# Patient Record
Sex: MALE | Race: WHITE | NOT HISPANIC OR LATINO
[De-identification: names, ages, dates, MRNs, and addresses within clinical notes are randomized per-mention and may not be internally consistent; named-entity substitution may affect disease eponyms.]

---

## 2020-12-21 ENCOUNTER — TRANSCRIPTION ENCOUNTER (OUTPATIENT)
Age: 60
End: 2020-12-21

## 2021-02-18 ENCOUNTER — TRANSCRIPTION ENCOUNTER (OUTPATIENT)
Age: 61
End: 2021-02-18

## 2023-03-01 ENCOUNTER — OFFICE (OUTPATIENT)
Dept: URBAN - METROPOLITAN AREA CLINIC 12 | Facility: CLINIC | Age: 63
Setting detail: OPHTHALMOLOGY
End: 2023-03-01
Payer: MEDICAID

## 2023-03-01 DIAGNOSIS — H40.013: ICD-10-CM

## 2023-03-01 DIAGNOSIS — H25.13: ICD-10-CM

## 2023-03-01 DIAGNOSIS — H52.4: ICD-10-CM

## 2023-03-01 DIAGNOSIS — H43.393: ICD-10-CM

## 2023-03-01 PROCEDURE — 92015 DETERMINE REFRACTIVE STATE: CPT | Performed by: OPTOMETRIST

## 2023-03-01 PROCEDURE — 92133 CPTRZD OPH DX IMG PST SGM ON: CPT | Performed by: OPTOMETRIST

## 2023-03-01 PROCEDURE — 92083 EXTENDED VISUAL FIELD XM: CPT | Performed by: OPTOMETRIST

## 2023-03-01 PROCEDURE — 92014 COMPRE OPH EXAM EST PT 1/>: CPT | Performed by: OPTOMETRIST

## 2023-03-01 ASSESSMENT — REFRACTION_MANIFEST
OS_VA1: 20/30-
OS_AXIS: 80
OS_SPHERE: -3.25
OD_AXIS: 105
OD_CYLINDER: -1.00
OS_SPHERE: -3.50
OD_CYLINDER: -1.00
OS_ADD: +2.50
OD_ADD: +2.50
OD_SPHERE: -1.75
OD_ADD: +2.50
OS_CYLINDER: -1.50
OD_VA1: 20/20-
OD_SPHERE: -1.25
OS_VA1: 20/25
OS_CYLINDER: -1.50
OD_VA1: 20/20
OS_AXIS: 080
OS_ADD: +2.50
OD_AXIS: 105

## 2023-03-01 ASSESSMENT — SPHEQUIV_DERIVED
OS_SPHEQUIV: -4.25
OD_SPHEQUIV: -1.75
OS_SPHEQUIV: -4
OD_SPHEQUIV: -2.25
OD_SPHEQUIV: -2.75
OS_SPHEQUIV: -5.125

## 2023-03-01 ASSESSMENT — KERATOMETRY
OS_K2POWER_DIOPTERS: 45.50
OS_AXISANGLE_DEGREES: 163
METHOD_AUTO_MANUAL: AUTO
OD_K1POWER_DIOPTERS: 44.50
OS_K1POWER_DIOPTERS: 44.50
OD_K2POWER_DIOPTERS: 45.00
OD_AXISANGLE_DEGREES: 10

## 2023-03-01 ASSESSMENT — REFRACTION_AUTOREFRACTION
OS_CYLINDER: -1.75
OD_AXIS: 107
OD_SPHERE: -2.25
OS_AXIS: 75
OD_CYLINDER: -1.00
OS_SPHERE: -4.25

## 2023-03-01 ASSESSMENT — REFRACTION_CURRENTRX
OD_ADD: +2.25
OS_OVR_VA: 20/
OD_SPHERE: -1.25
OD_OVR_VA: 20/
OD_AXIS: 091
OS_CYLINDER: -1.50
OD_VPRISM_DIRECTION: PROGS
OS_OVR_VA: 20/
OS_SPHERE: -2.75
OD_CYLINDER: -1.00
OD_OVR_VA: 20/
OS_VPRISM_DIRECTION: PROGS
OS_CYLINDER: -1.25
OD_SPHERE: -0.75
OS_ADD: +2.50
OD_ADD: +2.50
OD_AXIS: 105
OS_VPRISM_DIRECTION: PROGS
OS_AXIS: 070
OS_SPHERE: -3.50
OS_AXIS: 79
OD_CYLINDER: -1.00
OD_VPRISM_DIRECTION: PROGS
OS_ADD: +2.25

## 2023-03-01 ASSESSMENT — TONOMETRY
OS_IOP_MMHG: 17
OD_IOP_MMHG: 18

## 2023-03-01 ASSESSMENT — AXIALLENGTH_DERIVED
OS_AL: 24.751
OD_AL: 24.2205
OD_AL: 24.0169
OS_AL: 24.6443
OD_AL: 23.8167
OS_AL: 25.132

## 2023-03-01 ASSESSMENT — CONFRONTATIONAL VISUAL FIELD TEST (CVF)
OD_FINDINGS: FULL
OS_FINDINGS: FULL

## 2023-03-01 ASSESSMENT — VISUAL ACUITY
OS_BCVA: 20/30-1
OD_BCVA: 20/30-2

## 2023-03-01 ASSESSMENT — PACHYMETRY
OS_CT_UM: 536
OD_CT_UM: 526
OS_CT_CORRECTION: 1
OD_CT_CORRECTION: 1

## 2023-03-02 PROBLEM — H40.1111 POAG; RIGHT EYE MILD, LEFT EYE MILD: Status: ACTIVE | Noted: 2023-03-01

## 2023-03-02 PROBLEM — H40.1121 POAG; RIGHT EYE MILD, LEFT EYE MILD: Status: ACTIVE | Noted: 2023-03-01

## 2023-03-09 PROBLEM — Z00.00 ENCOUNTER FOR PREVENTIVE HEALTH EXAMINATION: Status: ACTIVE | Noted: 2023-03-09

## 2023-03-10 ENCOUNTER — APPOINTMENT (OUTPATIENT)
Dept: ORTHOPEDIC SURGERY | Facility: CLINIC | Age: 63
End: 2023-03-10
Payer: COMMERCIAL

## 2023-03-10 VITALS — BODY MASS INDEX: 28.44 KG/M2 | WEIGHT: 210 LBS | HEIGHT: 72 IN

## 2023-03-10 DIAGNOSIS — M19.90 UNSPECIFIED OSTEOARTHRITIS, UNSPECIFIED SITE: ICD-10-CM

## 2023-03-10 DIAGNOSIS — Z78.9 OTHER SPECIFIED HEALTH STATUS: ICD-10-CM

## 2023-03-10 DIAGNOSIS — M54.16 RADICULOPATHY, LUMBAR REGION: ICD-10-CM

## 2023-03-10 PROCEDURE — 99204 OFFICE O/P NEW MOD 45 MIN: CPT | Mod: 25

## 2023-03-10 PROCEDURE — 72110 X-RAY EXAM L-2 SPINE 4/>VWS: CPT

## 2023-03-10 RX ORDER — METHYLPREDNISOLONE 4 MG/1
4 TABLET ORAL
Qty: 1 | Refills: 0 | Status: ACTIVE | COMMUNITY
Start: 2023-03-10 | End: 1900-01-01

## 2023-03-10 NOTE — HISTORY OF PRESENT ILLNESS
[Lower back] : lower back [Tingling] : tingling [de-identified] : 03/10/2023: Patient is here for a new injury visit. States he started getting pain and numbness in his LT foot 1 month ago. No known injury/accident. No recent treatment.  New onset LLE weakness and numbness.  Has been   Recently drove to and from [place] with left leg pressed right up against door. \par  Hx of multiple back problems.  Evaluated at [facility] in [place] .  No MRI\par  Given MDP but took only days 1-3\par  [] : Post Surgical Visit: no [FreeTextEntry6] : numbness

## 2023-03-10 NOTE — IMAGING
[de-identified] : Spine:\par Inspection/Palpation:\par No tenderness to palpation throughout Cervical/thoracic/lumbar spine.\par No bony stepoffs, No lesions.\par \par Gait:\par Non-antalgic, able to perform bilateral toe and heel rise.  Able to perform tandem gait.  \par \par Range of Motion:\par Lumbar Spine: Flexion to 90 degrees, Extension to 30 degrees, rotation 30 degrees bilaterally, lateral flexion to 30 degrees bilaterally.\par \par Neurologic:\par \par Bilateral Lower Extremities 5/5 Iliopsoas/Quadriceps/Hamstrings/ Tibialis Anterior/ Gastrocnemius. Extensor Hallucis Longus/ Flexor Hallucis Longus except  L TA/EHL 4/5\par \par \par Sensation intact to light touch L2-S1\par \par Patellar/ Achilles reflex within normal limits.\par \par \par Negative straight leg raise\par \par No pain with IR/ER/Flexion of HIps bilaterally \par \par X-ray Ap/Lateral/Flexion/Extension of lumbar spine were viewed and interpreted.  loss of lordosis. Multilevel  DDD, and severe spondylosis

## 2023-03-10 NOTE — ASSESSMENT
[FreeTextEntry1] : 62 M with painless left foot drop and tingling\par MRI L spine stat rule out hnp\par MDP\par EMG rule out peroneal neuropathy given history\par FU after MRI and EMG

## 2023-03-13 ENCOUNTER — FORM ENCOUNTER (OUTPATIENT)
Age: 63
End: 2023-03-13

## 2023-03-14 ENCOUNTER — APPOINTMENT (OUTPATIENT)
Dept: MRI IMAGING | Facility: CLINIC | Age: 63
End: 2023-03-14
Payer: COMMERCIAL

## 2023-03-14 PROCEDURE — 72148 MRI LUMBAR SPINE W/O DYE: CPT

## 2023-03-16 ENCOUNTER — APPOINTMENT (OUTPATIENT)
Dept: NEUROLOGY | Facility: CLINIC | Age: 63
End: 2023-03-16

## 2023-03-24 ENCOUNTER — APPOINTMENT (OUTPATIENT)
Dept: ORTHOPEDIC SURGERY | Facility: CLINIC | Age: 63
End: 2023-03-24
Payer: COMMERCIAL

## 2023-03-24 VITALS — HEIGHT: 72 IN | WEIGHT: 210 LBS | BODY MASS INDEX: 28.44 KG/M2

## 2023-03-24 DIAGNOSIS — G57.32 LESION OF LATERAL POPLITEAL NERVE, LEFT LOWER LIMB: ICD-10-CM

## 2023-03-24 DIAGNOSIS — M21.372 FOOT DROP, LEFT FOOT: ICD-10-CM

## 2023-03-24 DIAGNOSIS — M54.50 LOW BACK PAIN, UNSPECIFIED: ICD-10-CM

## 2023-03-24 PROCEDURE — 99213 OFFICE O/P EST LOW 20 MIN: CPT

## 2023-03-24 NOTE — ASSESSMENT
[FreeTextEntry1] : 62 M with low back pain and stiffness.  And left peroneal neuropathy \par PT for foot and low back\par FU with neurology regarding peroneal neuropathy\par MRI L spine with multilevel degenerative changes.  \par FU 6 weeks if pain persists then consider Facet injections with pain management. \par

## 2023-03-24 NOTE — IMAGING
[de-identified] : Spine:\par Inspection/Palpation:\par No tenderness to palpation throughout Cervical/thoracic/lumbar spine.\par No bony stepoffs, No lesions.\par \par Gait:\par Non-antalgic, able to perform bilateral toe and heel rise.  Able to perform tandem gait.  \par \par Range of Motion:\par Lumbar Spine: Flexion to 90 degrees, Extension to 30 degrees, rotation 30 degrees bilaterally, lateral flexion to 30 degrees bilaterally.\par \par Neurologic:\par \par Bilateral Lower Extremities 5/5 Iliopsoas/Quadriceps/Hamstrings/ Tibialis Anterior/ Gastrocnemius. Extensor Hallucis Longus/ Flexor Hallucis Longus except  L TA/EHL 4/5\par \par \par Sensation intact to light touch L2-S1\par \par Patellar/ Achilles reflex within normal limits.\par \par \par Negative straight leg raise\par \par No pain with IR/ER/Flexion of HIps bilaterally \par \par X-ray Ap/Lateral/Flexion/Extension of lumbar spine were viewed and interpreted.  loss of lordosis. Multilevel  DDD, and severe spondylosis\par \par MRI Lumbar spine reviewed and interpreted independently.  Agree with report as follows. \par 1. Convexity of the upper-to-mid lumbar spine towards the right, multilevel degenerative disc disease, and multiple disc \par herniations.\par 2. Moderate central stenosis with impingement upon left greater than right exiting L3 nerve roots at L3-L4.\par 3. Moderate central stenosis with impingement upon the right L5 nerve root and right exiting L4 nerve root and left \par exiting L4 nerve root encroachment at L4-L5.\par 4. Mild central stenosis with impingement upon left greater than right exiting L2 nerve roots at L2-L3.\par 5. Right foraminal herniation impinging the right exiting L1 nerve root at L1-L2.\par 6. Findings suggesting multiple cysts in the visualized left kidney incompletely evaluated on the current exam.\par Recommend ultrasound of the kidneys to further evaluate.\par 7. Scattered degenerative endplate changes most severe on the right at L3-L4 and on the left at L4-L5 without acute \par vertebral body fracture.\par \par EMG report in chart.  Peroneal neuropathy

## 2023-03-24 NOTE — HISTORY OF PRESENT ILLNESS
[Lower back] : lower back [Tingling] : tingling [de-identified] : 03/10/2023: Patient is here for a new injury visit. States he started getting pain and numbness in his LT foot 1 month ago. No known injury/accident. No recent treatment.  New onset LLE weakness and numbness.  Has been   Recently drove to and from [place] with left leg pressed right up against door. \par  Hx of multiple back problems.  Evaluated at [facility] in [place] .  No MRI\par  Given MDP but took only days 1-3\par \par 03/24/2023: Follow up for MRI results. [] : Post Surgical Visit: no [FreeTextEntry6] : numbness

## 2024-01-04 PROBLEM — H11.153 PINGUECULA ; BOTH EYES: Status: ACTIVE | Noted: 2024-01-04

## 2024-09-16 ENCOUNTER — OFFICE (OUTPATIENT)
Dept: URBAN - METROPOLITAN AREA CLINIC 12 | Facility: CLINIC | Age: 64
Setting detail: OPHTHALMOLOGY
End: 2024-09-16
Payer: MEDICAID

## 2024-09-16 ENCOUNTER — RX ONLY (RX ONLY)
Age: 64
End: 2024-09-16

## 2024-09-16 DIAGNOSIS — H25.13: ICD-10-CM

## 2024-09-16 DIAGNOSIS — H40.1111: ICD-10-CM

## 2024-09-16 DIAGNOSIS — H43.393: ICD-10-CM

## 2024-09-16 DIAGNOSIS — H40.1121: ICD-10-CM

## 2024-09-16 PROCEDURE — 92083 EXTENDED VISUAL FIELD XM: CPT | Performed by: OPTOMETRIST

## 2024-09-16 PROCEDURE — 92014 COMPRE OPH EXAM EST PT 1/>: CPT | Performed by: OPTOMETRIST

## 2024-09-16 PROCEDURE — 92133 CPTRZD OPH DX IMG PST SGM ON: CPT | Performed by: OPTOMETRIST

## 2024-09-16 ASSESSMENT — CONFRONTATIONAL VISUAL FIELD TEST (CVF)
OS_FINDINGS: FULL
OD_FINDINGS: FULL

## 2024-09-18 ENCOUNTER — OFFICE (OUTPATIENT)
Dept: URBAN - METROPOLITAN AREA CLINIC 12 | Facility: CLINIC | Age: 64
Setting detail: OPHTHALMOLOGY
End: 2024-09-18
Payer: MEDICAID

## 2024-09-18 DIAGNOSIS — H43.393: ICD-10-CM

## 2024-09-18 DIAGNOSIS — H25.12: ICD-10-CM

## 2024-09-18 DIAGNOSIS — H40.013: ICD-10-CM

## 2024-09-18 DIAGNOSIS — H25.11: ICD-10-CM

## 2024-09-18 DIAGNOSIS — H25.13: ICD-10-CM

## 2024-09-18 PROCEDURE — 99214 OFFICE O/P EST MOD 30 MIN: CPT | Performed by: OPHTHALMOLOGY

## 2024-09-18 PROCEDURE — 92250 FUNDUS PHOTOGRAPHY W/I&R: CPT | Performed by: OPHTHALMOLOGY

## 2024-09-18 ASSESSMENT — CONFRONTATIONAL VISUAL FIELD TEST (CVF)
OS_FINDINGS: FULL
OD_FINDINGS: FULL

## 2024-09-25 ENCOUNTER — OFFICE (OUTPATIENT)
Dept: URBAN - METROPOLITAN AREA CLINIC 12 | Facility: CLINIC | Age: 64
Setting detail: OPHTHALMOLOGY
End: 2024-09-25
Payer: MEDICAID

## 2024-09-25 DIAGNOSIS — H25.13: ICD-10-CM

## 2024-09-25 DIAGNOSIS — H25.12: ICD-10-CM

## 2024-09-25 PROBLEM — H40.013 GLAUCOMA SUSPECT, LOW RISK 1-2 FACTORS; BOTH EYES: Status: ACTIVE | Noted: 2024-09-18

## 2024-09-25 PROBLEM — H25.11 CATARACT SENILE NUCLEAR SCLEROSIS; RIGHT EYE, LEFT EYE, BOTH EYES: Status: ACTIVE | Noted: 2024-09-18

## 2024-09-25 PROCEDURE — 92136 OPHTHALMIC BIOMETRY: CPT | Mod: TC | Performed by: OPHTHALMOLOGY

## 2024-09-25 PROCEDURE — 92136 OPHTHALMIC BIOMETRY: CPT | Mod: LT | Performed by: OPHTHALMOLOGY

## 2024-09-25 PROCEDURE — 99213 OFFICE O/P EST LOW 20 MIN: CPT | Performed by: OPHTHALMOLOGY

## 2024-09-25 ASSESSMENT — CONFRONTATIONAL VISUAL FIELD TEST (CVF)
OS_FINDINGS: FULL
OD_FINDINGS: FULL

## 2024-10-08 ENCOUNTER — ASC (OUTPATIENT)
Dept: URBAN - METROPOLITAN AREA SURGERY 8 | Facility: SURGERY | Age: 64
Setting detail: OPHTHALMOLOGY
End: 2024-10-08
Payer: COMMERCIAL

## 2024-10-08 DIAGNOSIS — H52.212: ICD-10-CM

## 2024-10-08 DIAGNOSIS — H25.12: ICD-10-CM

## 2024-10-08 PROCEDURE — FEMTO FEMTOSECOND LASER: Mod: GY | Performed by: OPHTHALMOLOGY

## 2024-10-08 PROCEDURE — 66984 XCAPSL CTRC RMVL W/O ECP: CPT | Mod: LT | Performed by: OPHTHALMOLOGY

## 2024-10-09 ENCOUNTER — RX ONLY (RX ONLY)
Age: 64
End: 2024-10-09

## 2024-10-09 ENCOUNTER — OFFICE (OUTPATIENT)
Dept: URBAN - METROPOLITAN AREA CLINIC 12 | Facility: CLINIC | Age: 64
Setting detail: OPHTHALMOLOGY
End: 2024-10-09
Payer: COMMERCIAL

## 2024-10-09 DIAGNOSIS — Z96.1: ICD-10-CM

## 2024-10-09 PROCEDURE — 99024 POSTOP FOLLOW-UP VISIT: CPT | Performed by: OPTOMETRIST

## 2024-10-09 ASSESSMENT — REFRACTION_CURRENTRX
OD_SPHERE: -1.25
OS_VPRISM_DIRECTION: PROGS
OD_VPRISM_DIRECTION: PROGS
OD_CYLINDER: -1.00
OD_SPHERE: -1.75
OS_AXIS: 79
OD_OVR_VA: 20/
OS_SPHERE: -3.50
OD_VPRISM_DIRECTION: PROGS
OD_AXIS: 105
OS_CYLINDER: -1.50
OS_ADD: +2.50
OD_OVR_VA: 20/
OS_SPHERE: -3.50
OS_AXIS: 077
OD_ADD: +2.25
OD_CYLINDER: -1.00
OS_OVR_VA: 20/
OS_ADD: +2.25
OS_OVR_VA: 20/
OD_AXIS: 105
OD_ADD: +2.50
OS_CYLINDER: -1.25
OS_VPRISM_DIRECTION: PROGS

## 2024-10-09 ASSESSMENT — REFRACTION_MANIFEST
OD_VA1: 20/30-
OS_AXIS: 80
OD_AXIS: 109
OD_SPHERE: -3.25
OD_CYLINDER: -1.00
OD_ADD: +2.50
OD_ADD: +2.50
OS_SPHERE: -6.00
OD_CYLINDER: -1.00
OS_CYLINDER: -1.50
OS_AXIS: 078
OS_VA1: 20/60-2
OD_SPHERE: -1.75
OD_SPHERE: -3.00
OS_SPHERE: -3.50
OS_ADD: +2.50
OS_ADD: +2.50
OS_CYLINDER: -1.50
OD_AXIS: 105
OD_VA1: 20/30-1
OS_AXIS: 082
OS_SPHERE: -6.25
OD_AXIS: 119
OS_CYLINDER: -1.50
OD_VA1: 20/20-
OS_VA1: 20/30-
OD_CYLINDER: -1.00
OS_VA1: 20/50-

## 2024-10-09 ASSESSMENT — VISUAL ACUITY
OD_BCVA: 20/25-2
OS_BCVA: 20/400

## 2024-10-09 ASSESSMENT — PACHYMETRY
OS_CT_UM: 536
OS_CT_CORRECTION: 1
OD_CT_CORRECTION: 1
OD_CT_UM: 526

## 2024-10-09 ASSESSMENT — KERATOMETRY
OS_K2POWER_DIOPTERS: 45.50
METHOD_AUTO_MANUAL: AUTO
OD_K2POWER_DIOPTERS: 44.75
OD_AXISANGLE_DEGREES: 009
OS_K1POWER_DIOPTERS: 44.75
OS_AXISANGLE_DEGREES: 171
OD_K1POWER_DIOPTERS: 44.00

## 2024-10-09 ASSESSMENT — CONFRONTATIONAL VISUAL FIELD TEST (CVF)
OS_FINDINGS: FULL
OD_FINDINGS: FULL

## 2024-10-09 ASSESSMENT — REFRACTION_AUTOREFRACTION
OS_CYLINDER: -1.00
OS_SPHERE: +0.50
OD_SPHERE: -2.75
OS_AXIS: 083
OD_AXIS: 112
OD_CYLINDER: -1.25

## 2024-10-09 ASSESSMENT — TONOMETRY: OD_IOP_MMHG: 16

## 2024-10-14 ENCOUNTER — OFFICE (OUTPATIENT)
Dept: URBAN - METROPOLITAN AREA CLINIC 12 | Facility: CLINIC | Age: 64
Setting detail: OPHTHALMOLOGY
End: 2024-10-14
Payer: COMMERCIAL

## 2024-10-14 DIAGNOSIS — H25.11: ICD-10-CM

## 2024-10-14 PROCEDURE — 92136 OPHTHALMIC BIOMETRY: CPT | Mod: RT | Performed by: OPHTHALMOLOGY

## 2024-10-14 ASSESSMENT — REFRACTION_CURRENTRX
OD_SPHERE: -1.25
OD_AXIS: 105
OS_OVR_VA: 20/
OS_OVR_VA: 20/
OS_AXIS: 79
OS_CYLINDER: -1.50
OD_VPRISM_DIRECTION: PROGS
OD_SPHERE: -1.75
OD_ADD: +2.25
OS_CYLINDER: -1.25
OS_AXIS: 077
OD_ADD: +2.50
OD_OVR_VA: 20/
OS_VPRISM_DIRECTION: PROGS
OD_OVR_VA: 20/
OS_ADD: +2.25
OS_VPRISM_DIRECTION: PROGS
OD_CYLINDER: -1.00
OD_VPRISM_DIRECTION: PROGS
OD_CYLINDER: -1.00
OS_SPHERE: -3.50
OS_SPHERE: -3.50
OS_ADD: +2.50
OD_AXIS: 105

## 2024-10-14 ASSESSMENT — REFRACTION_MANIFEST
OS_SPHERE: -6.25
OS_SPHERE: -3.50
OD_AXIS: 105
OD_AXIS: 119
OD_CYLINDER: -1.00
OS_ADD: +2.50
OS_VA1: 20/50-
OS_CYLINDER: -1.50
OS_AXIS: 80
OS_ADD: +2.50
OD_SPHERE: -3.00
OD_SPHERE: -1.75
OS_SPHERE: -6.00
OD_SPHERE: -3.25
OD_AXIS: 109
OD_ADD: +2.50
OS_VA1: 20/60-2
OD_VA1: 20/30-1
OD_CYLINDER: -1.00
OD_CYLINDER: -1.00
OD_ADD: +2.50
OD_VA1: 20/20-
OD_VA1: 20/30-
OS_CYLINDER: -1.50
OS_VA1: 20/30-
OS_AXIS: 078
OS_AXIS: 082
OS_CYLINDER: -1.50

## 2024-10-14 ASSESSMENT — KERATOMETRY
OS_AXISANGLE_DEGREES: 161
OD_AXISANGLE_DEGREES: 016
OS_K1POWER_DIOPTERS: 44.25
OD_K2POWER_DIOPTERS: 44.75
OS_K2POWER_DIOPTERS: 45.25
OD_K1POWER_DIOPTERS: 44.25
METHOD_AUTO_MANUAL: AUTO

## 2024-10-14 ASSESSMENT — REFRACTION_AUTOREFRACTION
OS_SPHERE: +1.00
OS_CYLINDER: -1.50
OD_SPHERE: -3.00
OS_AXIS: 074
OD_CYLINDER: -1.25
OD_AXIS: 110

## 2024-10-14 ASSESSMENT — PACHYMETRY
OD_CT_CORRECTION: 1
OS_CT_UM: 536
OD_CT_UM: 526
OS_CT_CORRECTION: 1

## 2024-10-14 ASSESSMENT — TONOMETRY
OS_IOP_MMHG: 15
OD_IOP_MMHG: 13

## 2024-10-14 ASSESSMENT — VISUAL ACUITY
OS_BCVA: 20/300
OD_BCVA: 20/30-2

## 2024-10-14 ASSESSMENT — CONFRONTATIONAL VISUAL FIELD TEST (CVF)
OD_FINDINGS: FULL
OS_FINDINGS: FULL

## 2024-10-22 ENCOUNTER — ASC (OUTPATIENT)
Dept: URBAN - METROPOLITAN AREA SURGERY 8 | Facility: SURGERY | Age: 64
Setting detail: OPHTHALMOLOGY
End: 2024-10-22
Payer: COMMERCIAL

## 2024-10-22 DIAGNOSIS — H25.11: ICD-10-CM

## 2024-10-22 DIAGNOSIS — H52.211: ICD-10-CM

## 2024-10-22 PROCEDURE — FEMTO PRECISION LASER CATARACT SURGERY: Mod: GY | Performed by: OPHTHALMOLOGY

## 2024-10-22 PROCEDURE — 66984 XCAPSL CTRC RMVL W/O ECP: CPT | Mod: 79,RT | Performed by: OPHTHALMOLOGY

## 2024-10-23 ENCOUNTER — RX ONLY (RX ONLY)
Age: 64
End: 2024-10-23

## 2024-10-23 ENCOUNTER — OFFICE (OUTPATIENT)
Dept: URBAN - METROPOLITAN AREA CLINIC 12 | Facility: CLINIC | Age: 64
Setting detail: OPHTHALMOLOGY
End: 2024-10-23
Payer: COMMERCIAL

## 2024-10-23 DIAGNOSIS — Z96.1: ICD-10-CM

## 2024-10-23 PROCEDURE — 99024 POSTOP FOLLOW-UP VISIT: CPT | Performed by: OPTOMETRIST

## 2024-10-23 ASSESSMENT — VISUAL ACUITY
OS_BCVA: 20/30
OD_BCVA: 20/30-2

## 2024-10-23 ASSESSMENT — REFRACTION_CURRENTRX
OD_VPRISM_DIRECTION: PROGS
OD_OVR_VA: 20/
OS_CYLINDER: -1.50
OS_VPRISM_DIRECTION: PROGS
OD_ADD: +2.25
OD_CYLINDER: -1.00
OS_VPRISM_DIRECTION: PROGS
OD_SPHERE: -1.25
OS_SPHERE: -3.50
OS_ADD: +2.25
OS_OVR_VA: 20/
OD_ADD: +2.50
OD_SPHERE: -1.75
OS_CYLINDER: -1.25
OS_AXIS: 79
OS_OVR_VA: 20/
OD_CYLINDER: -1.00
OD_OVR_VA: 20/
OD_AXIS: 105
OS_ADD: +2.50
OD_VPRISM_DIRECTION: PROGS
OS_AXIS: 077
OD_AXIS: 105
OS_SPHERE: -3.50

## 2024-10-23 ASSESSMENT — REFRACTION_MANIFEST
OD_ADD: +2.50
OD_SPHERE: -1.75
OS_ADD: +2.50
OS_ADD: +2.50
OD_VA1: 20/30-
OD_AXIS: 105
OS_VA1: 20/50-
OD_CYLINDER: -1.00
OS_SPHERE: -6.25
OD_SPHERE: -3.25
OS_CYLINDER: -1.50
OD_ADD: +2.50
OS_SPHERE: -6.00
OD_CYLINDER: -1.00
OS_CYLINDER: -1.50
OS_AXIS: 078
OD_CYLINDER: -1.00
OS_CYLINDER: -1.50
OS_AXIS: 80
OD_VA1: 20/20-
OD_VA1: 20/30-1
OS_SPHERE: -3.50
OD_AXIS: 109
OS_VA1: 20/30-
OS_AXIS: 082
OD_SPHERE: -3.00
OD_AXIS: 119
OS_VA1: 20/60-2

## 2024-10-23 ASSESSMENT — KERATOMETRY
OD_AXISANGLE_DEGREES: 025
OS_K2POWER_DIOPTERS: 45.50
OD_K2POWER_DIOPTERS: 45.50
OS_AXISANGLE_DEGREES: 172
OS_K1POWER_DIOPTERS: 44.50
OD_K1POWER_DIOPTERS: 45.00
METHOD_AUTO_MANUAL: AUTO

## 2024-10-23 ASSESSMENT — PACHYMETRY
OD_CT_CORRECTION: 1
OS_CT_CORRECTION: 1
OD_CT_UM: 526
OS_CT_UM: 536

## 2024-10-23 ASSESSMENT — REFRACTION_AUTOREFRACTION
OD_SPHERE: -0.25
OS_AXIS: 083
OS_CYLINDER: -1.25
OD_AXIS: 109
OD_CYLINDER: -1.25
OS_SPHERE: +0.75

## 2024-10-23 ASSESSMENT — TONOMETRY
OD_IOP_MMHG: 18
OS_IOP_MMHG: 11

## 2024-10-23 ASSESSMENT — CONFRONTATIONAL VISUAL FIELD TEST (CVF)
OD_FINDINGS: FULL
OS_FINDINGS: FULL

## 2024-10-28 ENCOUNTER — RX ONLY (RX ONLY)
Age: 64
End: 2024-10-28

## 2024-10-28 ENCOUNTER — OFFICE (OUTPATIENT)
Dept: URBAN - METROPOLITAN AREA CLINIC 12 | Facility: CLINIC | Age: 64
Setting detail: OPHTHALMOLOGY
End: 2024-10-28
Payer: COMMERCIAL

## 2024-10-28 DIAGNOSIS — Z96.1: ICD-10-CM

## 2024-10-28 PROCEDURE — 99024 POSTOP FOLLOW-UP VISIT: CPT | Performed by: OPTOMETRIST

## 2024-10-28 ASSESSMENT — KERATOMETRY
OD_K1POWER_DIOPTERS: 44.50
OD_K2POWER_DIOPTERS: 44.50
OS_K2POWER_DIOPTERS: 45.25
OS_AXISANGLE_DEGREES: 164
METHOD_AUTO_MANUAL: AUTO
OD_AXISANGLE_DEGREES: 090
OS_K1POWER_DIOPTERS: 44.50

## 2024-10-28 ASSESSMENT — REFRACTION_MANIFEST
OS_SPHERE: -6.25
OS_CYLINDER: -1.50
OS_ADD: +2.50
OD_VA1: 20/30-
OS_VA1: 20/60-2
OD_VA1: 20/30-1
OD_AXIS: 119
OD_SPHERE: -3.00
OD_AXIS: 109
OD_CYLINDER: -1.00
OS_SPHERE: -3.50
OS_VA1: 20/30-
OS_VA1: 20/50-
OS_AXIS: 078
OD_CYLINDER: -1.00
OS_AXIS: 082
OD_ADD: +2.50
OS_CYLINDER: -1.50
OS_SPHERE: -6.00
OS_CYLINDER: -1.50
OD_CYLINDER: -1.00
OS_AXIS: 80
OD_SPHERE: -3.25
OD_AXIS: 105
OS_ADD: +2.50
OD_ADD: +2.50
OD_SPHERE: -1.75
OD_VA1: 20/20-

## 2024-10-28 ASSESSMENT — PACHYMETRY
OS_CT_UM: 536
OD_CT_UM: 526
OD_CT_CORRECTION: 1
OS_CT_CORRECTION: 1

## 2024-10-28 ASSESSMENT — REFRACTION_CURRENTRX
OD_CYLINDER: -1.00
OD_VPRISM_DIRECTION: PROGS
OS_SPHERE: -3.50
OD_SPHERE: -1.25
OD_CYLINDER: -1.00
OS_SPHERE: -3.50
OS_VPRISM_DIRECTION: PROGS
OD_AXIS: 105
OD_VPRISM_DIRECTION: PROGS
OD_ADD: +2.25
OS_CYLINDER: -1.25
OD_OVR_VA: 20/
OS_ADD: +2.25
OD_SPHERE: -1.75
OS_OVR_VA: 20/
OD_AXIS: 105
OS_VPRISM_DIRECTION: PROGS
OD_OVR_VA: 20/
OS_OVR_VA: 20/
OS_AXIS: 077
OS_AXIS: 79
OD_ADD: +2.50
OS_CYLINDER: -1.50
OS_ADD: +2.50

## 2024-10-28 ASSESSMENT — REFRACTION_AUTOREFRACTION
OD_CYLINDER: -1.00
OS_AXIS: 076
OD_SPHERE: -0.50
OS_SPHERE: +0.75
OS_CYLINDER: -1.25
OD_AXIS: 102

## 2024-10-28 ASSESSMENT — CONFRONTATIONAL VISUAL FIELD TEST (CVF)
OD_FINDINGS: FULL
OS_FINDINGS: FULL

## 2024-10-28 ASSESSMENT — TONOMETRY
OD_IOP_MMHG: 15
OS_IOP_MMHG: 14

## 2024-10-28 ASSESSMENT — VISUAL ACUITY
OS_BCVA: 20/25-1
OD_BCVA: 20/25

## 2024-11-21 ENCOUNTER — OFFICE (OUTPATIENT)
Dept: URBAN - METROPOLITAN AREA CLINIC 12 | Facility: CLINIC | Age: 64
Setting detail: OPHTHALMOLOGY
End: 2024-11-21
Payer: COMMERCIAL

## 2024-11-21 DIAGNOSIS — H40.1121: ICD-10-CM

## 2024-11-21 DIAGNOSIS — H16.223: ICD-10-CM

## 2024-11-21 DIAGNOSIS — H40.001: ICD-10-CM

## 2024-11-21 PROCEDURE — 99214 OFFICE O/P EST MOD 30 MIN: CPT | Performed by: STUDENT IN AN ORGANIZED HEALTH CARE EDUCATION/TRAINING PROGRAM

## 2024-11-21 PROCEDURE — 92133 CPTRZD OPH DX IMG PST SGM ON: CPT | Performed by: STUDENT IN AN ORGANIZED HEALTH CARE EDUCATION/TRAINING PROGRAM

## 2024-11-21 PROCEDURE — 92020 GONIOSCOPY: CPT | Performed by: STUDENT IN AN ORGANIZED HEALTH CARE EDUCATION/TRAINING PROGRAM

## 2024-11-21 PROCEDURE — 92083 EXTENDED VISUAL FIELD XM: CPT | Performed by: STUDENT IN AN ORGANIZED HEALTH CARE EDUCATION/TRAINING PROGRAM

## 2024-11-21 ASSESSMENT — REFRACTION_AUTOREFRACTION
OS_CYLINDER: -1.50
OS_AXIS: 080
OD_AXIS: 101
OD_SPHERE: +0.75
OD_CYLINDER: -1.00
OS_SPHERE: +1.25

## 2024-11-21 ASSESSMENT — REFRACTION_CURRENTRX
OD_CYLINDER: -1.00
OS_VPRISM_DIRECTION: PROGS
OS_SPHERE: -3.50
OD_SPHERE: -1.25
OS_CYLINDER: -1.50
OS_AXIS: 077
OD_VPRISM_DIRECTION: PROGS
OD_SPHERE: -1.75
OD_AXIS: 105
OD_ADD: +2.50
OS_AXIS: 79
OD_OVR_VA: 20/
OS_CYLINDER: -1.25
OS_ADD: +2.25
OS_OVR_VA: 20/
OD_VPRISM_DIRECTION: PROGS
OD_CYLINDER: -1.00
OD_AXIS: 105
OD_OVR_VA: 20/
OD_ADD: +2.25
OS_OVR_VA: 20/
OS_SPHERE: -3.50
OS_ADD: +2.50
OS_VPRISM_DIRECTION: PROGS

## 2024-11-21 ASSESSMENT — REFRACTION_MANIFEST
OS_VA1: 20/50-
OS_SPHERE: -3.50
OS_SPHERE: -6.00
OS_CYLINDER: -1.50
OD_SPHERE: -3.25
OD_VA1: 20/30-
OD_SPHERE: -3.00
OS_AXIS: 082
OS_ADD: +2.50
OD_ADD: +2.50
OS_SPHERE: -6.25
OD_CYLINDER: -1.00
OD_CYLINDER: -1.00
OS_AXIS: 078
OD_AXIS: 119
OS_AXIS: 80
OD_VA1: 20/30-1
OD_SPHERE: -1.75
OS_CYLINDER: -1.50
OD_AXIS: 105
OS_VA1: 20/30-
OS_CYLINDER: -1.50
OD_VA1: 20/20-
OS_VA1: 20/60-2
OD_CYLINDER: -1.00
OD_AXIS: 109
OS_ADD: +2.50
OD_ADD: +2.50

## 2024-11-21 ASSESSMENT — KERATOMETRY
OS_K2POWER_DIOPTERS: 45.00
OS_K1POWER_DIOPTERS: 44.25
OD_K2POWER_DIOPTERS: 44.75
OD_AXISANGLE_DEGREES: 022
OS_AXISANGLE_DEGREES: 165
METHOD_AUTO_MANUAL: AUTO
OD_K1POWER_DIOPTERS: 44.25

## 2024-11-21 ASSESSMENT — VISUAL ACUITY
OD_BCVA: 20/40+3
OS_BCVA: 20/30+

## 2024-11-21 ASSESSMENT — CONFRONTATIONAL VISUAL FIELD TEST (CVF)
OD_FINDINGS: FULL
OS_FINDINGS: FULL

## 2024-12-02 ENCOUNTER — OFFICE (OUTPATIENT)
Dept: URBAN - METROPOLITAN AREA CLINIC 12 | Facility: CLINIC | Age: 64
Setting detail: OPHTHALMOLOGY
End: 2024-12-02
Payer: COMMERCIAL

## 2024-12-02 DIAGNOSIS — Z96.1: ICD-10-CM

## 2024-12-02 DIAGNOSIS — H16.223: ICD-10-CM

## 2024-12-02 PROBLEM — H40.001 GLAUCOMA SUSPECT; RIGHT EYE: Status: ACTIVE | Noted: 2024-11-21

## 2024-12-02 PROBLEM — H40.1121 POAG; LEFT EYE MILD: Status: ACTIVE | Noted: 2024-11-21

## 2024-12-02 PROCEDURE — 99024 POSTOP FOLLOW-UP VISIT: CPT | Performed by: OPTOMETRIST

## 2024-12-02 ASSESSMENT — REFRACTION_MANIFEST
OS_ADD: +2.50
OD_CYLINDER: -1.00
OD_ADD: +2.50
OD_ADD: +2.50
OS_AXIS: 078
OD_SPHERE: -3.00
OS_SPHERE: -3.50
OS_VA1: 20/50-
OD_CYLINDER: -1.00
OD_AXIS: 119
OS_SPHERE: -6.00
OD_AXIS: 109
OS_SPHERE: -6.25
OS_AXIS: 082
OS_VA1: 20/30-
OD_VA1: 20/30-
OD_VA1: 20/20-
OD_SPHERE: -1.75
OD_AXIS: 105
OS_CYLINDER: -1.50
OS_CYLINDER: -1.50
OS_VA1: 20/60-2
OS_ADD: +2.50
OD_CYLINDER: -1.00
OS_CYLINDER: -1.50
OS_AXIS: 80
OD_VA1: 20/30-1
OD_SPHERE: -3.25

## 2024-12-02 ASSESSMENT — REFRACTION_CURRENTRX
OS_ADD: +2.50
OS_OVR_VA: 20/
OD_AXIS: 105
OD_OVR_VA: 20/
OD_VPRISM_DIRECTION: PROGS
OD_CYLINDER: -1.00
OS_VPRISM_DIRECTION: PROGS
OS_ADD: +2.25
OD_SPHERE: -1.25
OD_AXIS: 105
OD_ADD: +2.50
OD_SPHERE: -1.75
OD_ADD: +2.25
OS_CYLINDER: -1.25
OD_VPRISM_DIRECTION: PROGS
OD_OVR_VA: 20/
OS_VPRISM_DIRECTION: PROGS
OS_SPHERE: -3.50
OS_AXIS: 79
OS_CYLINDER: -1.50
OD_CYLINDER: -1.00
OS_SPHERE: -3.50
OS_AXIS: 077
OS_OVR_VA: 20/

## 2024-12-02 ASSESSMENT — PACHYMETRY
OD_CT_CORRECTION: 1
OD_CT_UM: 526
OS_CT_CORRECTION: 1
OS_CT_UM: 536

## 2024-12-02 ASSESSMENT — KERATOMETRY
OD_AXISANGLE_DEGREES: 021
METHOD_AUTO_MANUAL: AUTO
OS_K1POWER_DIOPTERS: 44.00
OS_K2POWER_DIOPTERS: 45.25
OS_AXISANGLE_DEGREES: 167
OD_K1POWER_DIOPTERS: 44.25
OD_K2POWER_DIOPTERS: 44.50

## 2024-12-02 ASSESSMENT — CONFRONTATIONAL VISUAL FIELD TEST (CVF)
OS_FINDINGS: FULL
OD_FINDINGS: FULL

## 2024-12-02 ASSESSMENT — VISUAL ACUITY
OD_BCVA: 20/30-2
OS_BCVA: 20/30+

## 2024-12-02 ASSESSMENT — TONOMETRY
OD_IOP_MMHG: 10
OS_IOP_MMHG: 15

## 2024-12-02 ASSESSMENT — REFRACTION_AUTOREFRACTION
OD_AXIS: 099
OS_CYLINDER: -2.00
OD_CYLINDER: -1.00
OS_SPHERE: +1.75
OS_AXIS: 077
OD_SPHERE: +0.75

## 2025-01-02 ENCOUNTER — OFFICE (OUTPATIENT)
Dept: URBAN - METROPOLITAN AREA CLINIC 12 | Facility: CLINIC | Age: 65
Setting detail: OPHTHALMOLOGY
End: 2025-01-02
Payer: COMMERCIAL

## 2025-01-02 ENCOUNTER — RX ONLY (RX ONLY)
Age: 65
End: 2025-01-02

## 2025-01-02 DIAGNOSIS — Z96.1: ICD-10-CM

## 2025-01-02 DIAGNOSIS — H40.001: ICD-10-CM

## 2025-01-02 DIAGNOSIS — H40.1121: ICD-10-CM

## 2025-01-02 DIAGNOSIS — H16.223: ICD-10-CM

## 2025-01-02 PROCEDURE — 99213 OFFICE O/P EST LOW 20 MIN: CPT | Mod: 24 | Performed by: STUDENT IN AN ORGANIZED HEALTH CARE EDUCATION/TRAINING PROGRAM

## 2025-01-02 ASSESSMENT — KERATOMETRY
OS_K1POWER_DIOPTERS: 44.00
OD_K1POWER_DIOPTERS: 44.25
OS_AXISANGLE_DEGREES: 163
OD_K2POWER_DIOPTERS: 44.75
OD_AXISANGLE_DEGREES: 018
OS_K2POWER_DIOPTERS: 45.50
METHOD_AUTO_MANUAL: AUTO

## 2025-01-02 ASSESSMENT — TONOMETRY
OS_IOP_MMHG: 11
OD_IOP_MMHG: 19

## 2025-01-02 ASSESSMENT — REFRACTION_CURRENTRX
OS_SPHERE: -3.50
OS_ADD: +2.50
OD_CYLINDER: -1.00
OS_CYLINDER: -1.50
OD_OVR_VA: 20/
OS_SPHERE: -3.50
OD_VPRISM_DIRECTION: PROGS
OD_AXIS: 105
OD_SPHERE: -1.25
OS_ADD: +2.25
OD_CYLINDER: -1.00
OS_OVR_VA: 20/
OD_OVR_VA: 20/
OD_ADD: +2.25
OD_AXIS: 105
OD_VPRISM_DIRECTION: PROGS
OS_CYLINDER: -1.25
OS_AXIS: 79
OS_OVR_VA: 20/
OD_ADD: +2.50
OD_SPHERE: -1.75
OS_AXIS: 077
OS_VPRISM_DIRECTION: PROGS
OS_VPRISM_DIRECTION: PROGS

## 2025-01-02 ASSESSMENT — PACHYMETRY
OS_CT_UM: 536
OS_CT_CORRECTION: 1
OD_CT_UM: 526
OD_CT_CORRECTION: 1

## 2025-01-02 ASSESSMENT — REFRACTION_MANIFEST
OS_CYLINDER: -1.50
OS_VA1: 20/50-
OS_CYLINDER: -1.50
OD_VA1: 20/30-
OD_AXIS: 109
OS_AXIS: 078
OD_VA1: 20/20-
OS_SPHERE: -3.50
OD_SPHERE: -3.00
OS_AXIS: 80
OD_CYLINDER: -1.00
OS_VA1: 20/30-
OD_SPHERE: -3.25
OD_SPHERE: -1.75
OD_CYLINDER: -1.00
OS_VA1: 20/60-2
OD_VA1: 20/30-1
OS_SPHERE: -6.25
OS_AXIS: 082
OS_SPHERE: -6.00
OD_AXIS: 105
OD_CYLINDER: -1.00
OD_AXIS: 119
OS_ADD: +2.50
OD_ADD: +2.50
OS_CYLINDER: -1.50
OD_ADD: +2.50
OS_ADD: +2.50

## 2025-01-02 ASSESSMENT — VISUAL ACUITY
OS_BCVA: 20/30+
OD_BCVA: 20/30+2

## 2025-01-02 ASSESSMENT — REFRACTION_AUTOREFRACTION
OD_AXIS: 100
OS_AXIS: 079
OS_CYLINDER: -1.75
OD_CYLINDER: -1.00
OS_SPHERE: +1.25
OD_SPHERE: +0.50

## 2025-01-02 ASSESSMENT — CONFRONTATIONAL VISUAL FIELD TEST (CVF)
OS_FINDINGS: FULL
OD_FINDINGS: FULL

## 2025-01-06 PROBLEM — H11.153 PINGUECULA ; BOTH EYES: Status: ACTIVE | Noted: 2025-01-06

## 2025-05-29 ENCOUNTER — NON-APPOINTMENT (OUTPATIENT)
Age: 65
End: 2025-05-29

## 2025-05-30 ENCOUNTER — NON-APPOINTMENT (OUTPATIENT)
Age: 65
End: 2025-05-30

## 2025-05-30 ENCOUNTER — APPOINTMENT (OUTPATIENT)
Dept: ORTHOPEDIC SURGERY | Facility: CLINIC | Age: 65
End: 2025-05-30
Payer: COMMERCIAL

## 2025-05-30 VITALS — HEIGHT: 72 IN | WEIGHT: 185 LBS | BODY MASS INDEX: 25.06 KG/M2

## 2025-05-30 DIAGNOSIS — M20.22 HALLUX RIGIDUS, LEFT FOOT: ICD-10-CM

## 2025-05-30 DIAGNOSIS — M20.5X2 OTHER DEFORMITIES OF TOE(S) (ACQUIRED), LEFT FOOT: ICD-10-CM

## 2025-05-30 PROCEDURE — 73630 X-RAY EXAM OF FOOT: CPT | Mod: 50

## 2025-05-30 PROCEDURE — 99204 OFFICE O/P NEW MOD 45 MIN: CPT
